# Patient Record
Sex: MALE | Race: AMERICAN INDIAN OR ALASKA NATIVE | NOT HISPANIC OR LATINO | Employment: UNEMPLOYED | URBAN - METROPOLITAN AREA
[De-identification: names, ages, dates, MRNs, and addresses within clinical notes are randomized per-mention and may not be internally consistent; named-entity substitution may affect disease eponyms.]

---

## 2022-01-10 ENCOUNTER — APPOINTMENT (OUTPATIENT)
Dept: GENERAL RADIOLOGY | Facility: CLINIC | Age: 42
End: 2022-01-10
Attending: EMERGENCY MEDICINE
Payer: COMMERCIAL

## 2022-01-10 ENCOUNTER — HOSPITAL ENCOUNTER (EMERGENCY)
Facility: CLINIC | Age: 42
Discharge: HOME | End: 2022-01-10
Attending: EMERGENCY MEDICINE | Admitting: EMERGENCY MEDICINE
Payer: COMMERCIAL

## 2022-01-10 VITALS
OXYGEN SATURATION: 97 % | DIASTOLIC BLOOD PRESSURE: 88 MMHG | RESPIRATION RATE: 16 BRPM | SYSTOLIC BLOOD PRESSURE: 142 MMHG | WEIGHT: 240 LBS | HEART RATE: 78 BPM | TEMPERATURE: 98.3 F
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
## 2022-01-10 DIAGNOSIS — S62.514A NONDISPLACED FRACTURE OF PROXIMAL PHALANX OF RIGHT THUMB, INITIAL ENCOUNTER FOR CLOSED FRACTURE: ICD-10-CM

## 2022-01-10 DIAGNOSIS — S00.33XA CONTUSION OF NOSE, INITIAL ENCOUNTER: ICD-10-CM

## 2022-01-10 DIAGNOSIS — S01.81XA LACERATION OF FOREHEAD, INITIAL ENCOUNTER: ICD-10-CM

## 2022-01-10 PROCEDURE — 999N000065 XR FINGER RT G/E 2 VW: Mod: RT

## 2022-01-10 PROCEDURE — 99285 EMERGENCY DEPT VISIT HI MDM: CPT | Mod: 25

## 2022-01-10 PROCEDURE — 99284 EMERGENCY DEPT VISIT MOD MDM: CPT | Mod: 25 | Performed by: EMERGENCY MEDICINE

## 2022-01-10 PROCEDURE — 26725 TREAT FINGER FRACTURE EACH: CPT | Mod: 54 | Performed by: EMERGENCY MEDICINE

## 2022-01-10 PROCEDURE — 26725 TREAT FINGER FRACTURE EACH: CPT
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
## 2022-01-11 NOTE — DISCHARGE INSTRUCTIONS
Do not apply any antibiotic over the laceration for this that will breakdown the glue.  After a week you can remove the glue if is not worn out.  Watch for secondary infection.  Have the thumb mckinley-rayed in 10 days to 2 weeks to see how things are healing.  Keep splinted until mckinley-ray and further decisions can be made.  Ice for swelling, Tylenol or ibuprofen for pain.

## 2022-02-07 NOTE — TELEPHONE ENCOUNTER
DIAGNOSIS: R hand thumb pain   APPOINTMENT DATE: 02/11/2022   NOTES STATUS DETAILS   OFFICE NOTE from referring provider N/A    OFFICE NOTE from other specialist N/A    DISCHARGE SUMMARY from hospital N/A    DISCHARGE REPORT from the ER Internal 01/10/2022 [facility] ED Dr Moreland   OPERATIVE REPORT N/A    EMG report N/A    MEDICATION LIST N/A    MRI N/A    DEXA (osteoporosis/bone health) N/A    CT SCAN N/A    XRAYS (IMAGES & REPORTS) Internal 01/10/2022 RT finger

## 2022-02-11 ENCOUNTER — ANCILLARY PROCEDURE (OUTPATIENT)
Dept: GENERAL RADIOLOGY | Facility: CLINIC | Age: 42
End: 2022-02-11
Attending: FAMILY MEDICINE

## 2022-02-11 ENCOUNTER — OFFICE VISIT (OUTPATIENT)
Dept: ORTHOPEDICS | Facility: CLINIC | Age: 42
End: 2022-02-11
Payer: COMMERCIAL

## 2022-02-11 ENCOUNTER — PRE VISIT (OUTPATIENT)
Dept: ORTHOPEDICS | Facility: CLINIC | Age: 42
End: 2022-02-11

## 2022-02-11 DIAGNOSIS — S62.521A CLOSED FRACTURE OF BASE OF DISTAL PHALANX OF RIGHT THUMB: ICD-10-CM

## 2022-02-11 DIAGNOSIS — S62.521A CLOSED FRACTURE OF BASE OF DISTAL PHALANX OF RIGHT THUMB: Primary | ICD-10-CM

## 2022-02-11 PROCEDURE — 73140 X-RAY EXAM OF FINGER(S): CPT | Mod: RT | Performed by: RADIOLOGY

## 2022-02-11 PROCEDURE — 99203 OFFICE O/P NEW LOW 30 MIN: CPT | Performed by: FAMILY MEDICINE
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
